# Patient Record
Sex: MALE | Race: WHITE | Employment: OTHER | URBAN - METROPOLITAN AREA
[De-identification: names, ages, dates, MRNs, and addresses within clinical notes are randomized per-mention and may not be internally consistent; named-entity substitution may affect disease eponyms.]

---

## 2022-09-08 ENCOUNTER — APPOINTMENT (OUTPATIENT)
Dept: GENERAL RADIOLOGY | Age: 52
End: 2022-09-08
Payer: COMMERCIAL

## 2022-09-08 ENCOUNTER — HOSPITAL ENCOUNTER (EMERGENCY)
Age: 52
Discharge: HOME | End: 2022-09-08
Attending: EMERGENCY MEDICINE
Payer: COMMERCIAL

## 2022-09-08 ENCOUNTER — APPOINTMENT (OUTPATIENT)
Dept: CT IMAGING | Age: 52
End: 2022-09-08
Payer: COMMERCIAL

## 2022-09-08 VITALS
TEMPERATURE: 97.6 F | HEART RATE: 92 BPM | DIASTOLIC BLOOD PRESSURE: 83 MMHG | SYSTOLIC BLOOD PRESSURE: 143 MMHG | OXYGEN SATURATION: 98 % | RESPIRATION RATE: 16 BRPM

## 2022-09-08 DIAGNOSIS — R07.89 LEFT-SIDED CHEST WALL PAIN: Primary | ICD-10-CM
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
## 2022-09-08 DIAGNOSIS — M54.50 ACUTE RIGHT-SIDED LOW BACK PAIN WITHOUT SCIATICA: ICD-10-CM

## 2022-09-08 DIAGNOSIS — M25.531 RIGHT WRIST PAIN: ICD-10-CM

## 2022-09-08 PROCEDURE — 96372 THER/PROPH/DIAG INJ SC/IM: CPT

## 2022-09-08 PROCEDURE — 71101 X-RAY EXAM UNILAT RIBS/CHEST: CPT

## 2022-09-08 PROCEDURE — 70450 CT HEAD/BRAIN W/O DYE: CPT

## 2022-09-08 PROCEDURE — 73110 X-RAY EXAM OF WRIST: CPT

## 2022-09-08 PROCEDURE — 6370000000 HC RX 637 (ALT 250 FOR IP): Performed by: EMERGENCY MEDICINE

## 2022-09-08 PROCEDURE — 6360000002 HC RX W HCPCS: Performed by: EMERGENCY MEDICINE

## 2022-09-08 PROCEDURE — 99284 EMERGENCY DEPT VISIT MOD MDM: CPT

## 2022-09-08 RX ORDER — HYDROCODONE BITARTRATE AND ACETAMINOPHEN 5; 325 MG/1; MG/1
1 TABLET ORAL EVERY 4 HOURS PRN
Qty: 12 TABLET | Refills: 0 | Status: SHIPPED | OUTPATIENT
Start: 2022-09-08 | End: 2022-09-11

## 2022-09-08 RX ORDER — ATORVASTATIN CALCIUM 10 MG/1
1 TABLET, FILM COATED ORAL NIGHTLY
COMMUNITY
Start: 2022-06-30

## 2022-09-08 RX ORDER — MELOXICAM 7.5 MG/1
TABLET ORAL
COMMUNITY
Start: 2022-08-15

## 2022-09-08 RX ORDER — METHOCARBAMOL 750 MG/1
750 TABLET, FILM COATED ORAL 4 TIMES DAILY
Qty: 40 TABLET | Refills: 0 | Status: SHIPPED | OUTPATIENT
Start: 2022-09-08 | End: 2022-09-18

## 2022-09-08 RX ORDER — METHOCARBAMOL 500 MG/1
750 TABLET, FILM COATED ORAL ONCE
Status: COMPLETED | OUTPATIENT
Start: 2022-09-08 | End: 2022-09-08

## 2022-09-08 RX ORDER — LOSARTAN POTASSIUM 50 MG/1
1 TABLET ORAL DAILY
COMMUNITY
Start: 2022-07-28

## 2022-09-08 RX ORDER — SITAGLIPTIN AND METFORMIN HYDROCHLORIDE 1000; 50 MG/1; MG/1
TABLET, FILM COATED ORAL
COMMUNITY
Start: 2022-08-27

## 2022-09-08 RX ORDER — KETOROLAC TROMETHAMINE 30 MG/ML
30 INJECTION, SOLUTION INTRAMUSCULAR; INTRAVENOUS ONCE
Status: COMPLETED | OUTPATIENT
Start: 2022-09-08 | End: 2022-09-08

## 2022-09-08 RX ADMIN — KETOROLAC TROMETHAMINE 30 MG: 30 INJECTION, SOLUTION INTRAMUSCULAR at 16:39

## 2022-09-08 RX ADMIN — METHOCARBAMOL TABLETS 750 MG: 500 TABLET, COATED ORAL at 16:38

## 2022-09-08 ASSESSMENT — PAIN DESCRIPTION - ORIENTATION: ORIENTATION: POSTERIOR

## 2022-09-08 ASSESSMENT — PAIN - FUNCTIONAL ASSESSMENT: PAIN_FUNCTIONAL_ASSESSMENT: 0-10

## 2022-09-08 ASSESSMENT — PAIN DESCRIPTION - LOCATION: LOCATION: SHOULDER;HEAD

## 2022-09-08 ASSESSMENT — PAIN SCALES - GENERAL: PAINLEVEL_OUTOF10: 8

## 2022-09-08 NOTE — ED PROVIDER NOTES
[address] [facility]          ATTENDING PHYSICIAN NOTE       Date of evaluation: 9/8/2022    Chief Complaint     Fall (From top of tow truck, while unloading motorcycle, approximately 7 feet up. Pt fell onto back, pain worst in back, shoulder and head. Pt states that he lost consciousness after fall. This happened around 1100)      History of Present Illness     Kristen Wilde is a 47 y.o. male who presents to the emergency department with complaints of several areas of pain after a fall off of the top of a flatbed tow truck this morning at about 11 AM, several hours prior to presentation. The patient states that he was securing a damaged motorcycle, when he slipped and fell. He believes that he fell from a height of approximately 7 feet. He recalls landing on his upper back, and striking his head and then losing consciousness. He is not certain how long he was unconscious, but he believes that it could not of been within a few minutes. The patient states that he was able to get up off of the ground, and finish securing the motorcycle to the flatbed tow truck, and then completed that run and did 2 more tow truck runs. He states that over that period of time he was having gradually increasing pain in the left rib cage, the right low back, the left side of the neck, and the right wrist.  He tried taking a dose of ibuprofen a few hours ago, but feels that the pain has worsened nonetheless. He does have a mild headache, but denies vision changes or nausea. He has a small goose egg on the back of the head where he struck his head. He describes soreness of the left rib cage from anterior to posterior, that is worse with deep breaths, and describes this as 8 out of 10 in intensity. He describes pain that has been gradually worsening in his right low back only, without radiation into the abdomen or leg. He denies any abdominal pain.   He has noticed some increasing pain and swelling in his right wrist, although he does not recall striking the wrist particularly. Review of Systems     Review of Systems   All other systems reviewed and are negative. Past Medical, Surgical, Family, and Social History     He has a past medical history of Diabetes mellitus ([address] He has no past surgical history on file. His family history is not on file. He reports that he has never smoked. He has never used smokeless tobacco. He reports that he does not drink alcohol and does not use drugs. Medications     Discharge Medication List as of 9/8/2022  5:47 PM        CONTINUE these medications which have NOT CHANGED    Details   atorvastatin (LIPITOR) 10 MG tablet Take 1 tablet by mouth at bedtimeHistorical Med      losartan (COZAAR) 50 MG tablet Take 1 tablet by mouth dailyHistorical Med      meloxicam (MOBIC) 7.5 MG tablet Historical Med      JANUMET  MG per tablet DAWHistorical Med             Allergies     He has No Known Allergies. Physical Exam     INITIAL VITALS: BP: (!) 143/83, Temp: 97.6 °F (36.4 °C), Heart Rate: 92, Resp: 16, SpO2: 98 %     General: Well appearing, well-developed gentleman. He is pleasantly conversational, and in NAD. HEENT: Pupils are equal, round, and reactive to light. Extraocular muscles are intact. Conjunctivae are clear and moist. No redness or drainage from the eyes. No drainage from the nose. The oropharynx appeared to be normal.  On examination of the posterior head, there is a small, quarter sized ecchymosis over the left occiput, without any associated break in the skin. Neck: Supple, with full range of motion. No midline C-spine tenderness to palpation, crepitus, or step-offs. There is mild tenderness to palpation in the left cervical paraspinous musculature. Back: No midline T or L spine tenderness to palpation, crepitus, or step-offs.   There is tenderness to palpation and palpable muscle spasm in the right lumbar paraspinous musculature. Chest: There is tenderness to palpation of the left anterior chest wall, lateral chest wall, and posterior chest wall, diffusely in the range of the fifth through eighth ribs. No palpable crepitus or step-offs. No sternal tenderness to palpation. Cardiovascular: Normal S1-S2 without murmur rub or gallop. 2+ radial pulses bilaterally. Respiratory: Unlabored breathing with equal chest rise and fall. Lungs are clear to auscultation bilaterally. No adventitious lung sounds heard. Abdomen: Soft and nontender, without guarding or rebound tenderness. No masses or hepatosplenomegaly. Skin: Warm and dry. There is a small ecchymosis on the occipital scalp as described above. There is a faint abrasion over the ulnar aspect of the proximal forearms bilaterally, without active bleeding or discrete laceration. Neuro: Alert and oriented x3. No focal neurologic deficits are noted. Extremities: Warm and well-perfused. The patient moves all extremities equally. On focused examination of the right upper extremity, there is some faint diffuse edema about the wrist, without focal bony tenderness to palpation. The patient has good range of motion without limitation by pain. There are no other long bone or joint deformities, swelling, tenderness to palpation, or limitation of range of motion by pain. Psych: The patient's mood and affect are generally within normal limits for their presentation. Diagnostic Results     RADIOLOGY:  CT HEAD WO CONTRAST   Final Result      No evidence of acute intracranial abnormality. Without contrast      XR RIBS LEFT INCLUDE CHEST (MIN 3 VIEWS)   Final Result   1. No acute cardiopulmonary findings. XR WRIST RIGHT (MIN 3 VIEWS)   Final Result      No acute fracture seen. LABS:   No results found for this visit on 09/08/22.     RECENT VITALS:  BP: (!) 143/83, Temp: 97.6 °F (36.4 °C), Heart Rate: 92, Resp: 16, SpO2: 98 % Procedures     ED Course     Nursing Notes, Past Medical Hx, Past Surgical Hx, Social Hx, Allergies, and Family Hx were reviewed. The patient was given the following medications:  Orders Placed This Encounter   Medications    methocarbamol (ROBAXIN) tablet 750 mg    ketorolac (TORADOL) injection 30 mg    methocarbamol (ROBAXIN-750) 750 MG tablet     Sig: Take 1 tablet by mouth 4 times daily for 10 days     Dispense:  40 tablet     Refill:  0    HYDROcodone-acetaminophen (NORCO) 5-325 MG per tablet     Sig: Take 1 tablet by mouth every 4 hours as needed for Pain for up to 3 days. Intended supply: 3 days. Take lowest dose possible to manage pain     Dispense:  12 tablet     Refill:  0       CONSULTS:  None    MEDICAL DECISION MAKING / ASSESSMENT / PLAN     Marcello Bill is a 47 y.o. male, otherwise generally healthy, who presents to the emergency department with complaints of left-sided chest wall pain, right-sided low back pain, and right wrist pain, after a fall off the back of a stationary tow truck. He did strike his head and had loss of consciousness, so in addition to imaging of the chest and ribs and right wrist, head CT was performed, which was reassuring. Chest and rib films showed no displaced rib fractures or signs of underlying pulmonary complications. Right wrist x-ray showed no bony abnormality. The patient's low back pain was in the paraspinous region, without focal bony tenderness, and imaging was not indicated. The patient was treated in the emergency department with IM Toradol and p.o. Robaxin, with moderate improvement of his pain. As the patient's imaging has shown no concerning abnormalities, and the fall was strictly mechanical in nature, he was thereafter felt stable for discharge. He was given prescriptions for Robaxin and 12 tablets of Norco, to take in addition to his daily home meloxicam, and was given self-care instructions and return precautions.       Clinical Impression

## 2022-09-08 NOTE — ED NOTES
Patient prepared for and ready to be discharged. Patient discharged at this time in no acute distress after verbalizing understanding of discharge instructions. Patient left after receiving After Visit Summary instructions.         Carmen Peterson RN  09/08/22 [id number]

## 2022-09-08 NOTE — DISCHARGE INSTRUCTIONS
Continue to take your meloxicam every day as prescribed, for anti-inflammatory effect. . You may take Robaxin as prescribed for muscle spasm pain, and hydrocodone as needed for severe, breakthrough pain, but use caution, as these medications can cause sedation, and you should not drink alcohol, drive, or operate machinery while taking these medications. Please understand, you will hurt worse tomorrow than you do today. This is normal and expected. You should begin to gradually improve each day after that. Please call your primary care doctor's office arrange a follow-up appointment, as you may benefit from referral for physical therapy, especially if your back pain last longer than a few days. Please call your doctor, or return to the emergency department, if you have increasing pain, or if you develop pain in other areas of your body that were not previously painful, or if you develop persistent headaches, visual changes, nausea or vomiting, or other concerning symptoms.